# Patient Record
Sex: MALE | URBAN - METROPOLITAN AREA
[De-identification: names, ages, dates, MRNs, and addresses within clinical notes are randomized per-mention and may not be internally consistent; named-entity substitution may affect disease eponyms.]

---

## 2020-08-19 ENCOUNTER — AMBULATORY - HEALTHEAST (OUTPATIENT)
Dept: BEHAVIORAL HEALTH | Facility: CLINIC | Age: 30
End: 2020-08-19

## 2020-08-21 ENCOUNTER — COMMUNICATION - HEALTHEAST (OUTPATIENT)
Dept: SCHEDULING | Facility: CLINIC | Age: 30
End: 2020-08-21

## 2021-06-10 NOTE — PROGRESS NOTES
S: Betsy, [facility] ED, 30/M, VH and AH     B: Hx of schizophrenia   Hx of 4500 IP MH stay  Pt came in w laceration on arm, pt reports he did it himself, 3 inches, it has been patches, unknown if stitches were used or not, unknown if any bandage is longer than 6 inches   Pt reports he is seeing demons, and hearing demonic voices, command telling him to hurt himself   Pt calm and pleasant in the ED, accompanied by lyudmila Quach reports increase in sleeping     Medically cleared, eating, drinking, ambulating indep   No covid concerns, no swab ordered     A: 72 hour hold    R: 4500/Terry     416pm - Terry accepts, ensure the covid swab is collected before moving forward   419pm - Intake called ED to request covid swab be collected  428pm - Per Hlee pt is now on 72 hour hold, intake requested a copy of the hold be faxed to the office. Awaiting covid collection and hold paperwork   446pm - ED reports covid has been collected, intake expressed the need for the hold paperwork to be faxed.   Pt placed in que   448pm - no answer on unit, intake will try later   500pm - unit charge notified, unit will call when ready for report   Hold paperwork received and faxed to the unit   521pm - ED notified

## 2021-06-16 PROBLEM — R45.89 AT RISK FOR SELF HARM: Status: ACTIVE | Noted: 2020-08-19

## 2021-06-16 PROBLEM — F12.10 MARIJUANA ABUSE: Status: ACTIVE | Noted: 2018-11-18

## 2021-06-16 PROBLEM — A74.9 CHLAMYDIA INFECTION: Status: ACTIVE | Noted: 2021-02-23

## 2021-06-16 PROBLEM — F22 PSYCHOSIS, PARANOID (H): Status: ACTIVE | Noted: 2018-11-18

## 2021-06-16 PROBLEM — F23 ACUTE PSYCHOSIS (H): Status: ACTIVE | Noted: 2018-11-18

## 2021-06-16 PROBLEM — F29 PSYCHOSIS, UNSPECIFIED PSYCHOSIS TYPE (H): Status: ACTIVE | Noted: 2020-08-19

## 2021-06-27 ENCOUNTER — HEALTH MAINTENANCE LETTER (OUTPATIENT)
Age: 31
End: 2021-06-27

## 2021-10-17 ENCOUNTER — HEALTH MAINTENANCE LETTER (OUTPATIENT)
Age: 31
End: 2021-10-17

## 2022-07-23 ENCOUNTER — HEALTH MAINTENANCE LETTER (OUTPATIENT)
Age: 32
End: 2022-07-23

## 2022-10-01 ENCOUNTER — HEALTH MAINTENANCE LETTER (OUTPATIENT)
Age: 32
End: 2022-10-01

## 2023-08-12 ENCOUNTER — HEALTH MAINTENANCE LETTER (OUTPATIENT)
Age: 33
End: 2023-08-12